# Patient Record
Sex: MALE | Race: WHITE
[De-identification: names, ages, dates, MRNs, and addresses within clinical notes are randomized per-mention and may not be internally consistent; named-entity substitution may affect disease eponyms.]

---

## 2019-03-21 ENCOUNTER — HOSPITAL ENCOUNTER (OUTPATIENT)
Dept: HOSPITAL 91 - GIL | Age: 54
Discharge: HOME | End: 2019-03-21
Payer: COMMERCIAL

## 2019-03-21 ENCOUNTER — HOSPITAL ENCOUNTER (OUTPATIENT)
Dept: HOSPITAL 10 - GIL | Age: 54
Discharge: HOME | End: 2019-03-21
Attending: INTERNAL MEDICINE
Payer: COMMERCIAL

## 2019-03-21 VITALS
WEIGHT: 224.65 LBS | HEIGHT: 73 IN | BODY MASS INDEX: 29.77 KG/M2 | HEIGHT: 73 IN | WEIGHT: 224.65 LBS | BODY MASS INDEX: 29.77 KG/M2

## 2019-03-21 VITALS — RESPIRATION RATE: 16 BRPM | DIASTOLIC BLOOD PRESSURE: 60 MMHG | SYSTOLIC BLOOD PRESSURE: 110 MMHG | HEART RATE: 74 BPM

## 2019-03-21 VITALS — HEART RATE: 84 BPM | RESPIRATION RATE: 18 BRPM | DIASTOLIC BLOOD PRESSURE: 73 MMHG | SYSTOLIC BLOOD PRESSURE: 115 MMHG

## 2019-03-21 DIAGNOSIS — Z12.11: Primary | ICD-10-CM

## 2019-03-21 DIAGNOSIS — I10: ICD-10-CM

## 2019-03-21 DIAGNOSIS — C18.9: ICD-10-CM

## 2019-03-21 PROCEDURE — 45331 SIGMOIDOSCOPY AND BIOPSY: CPT

## 2019-03-21 PROCEDURE — 88305 TISSUE EXAM BY PATHOLOGIST: CPT

## 2019-03-21 NOTE — HPN
Date/Time of Note


Date/Time of Note


DATE: 3/21/19 


TIME: 13:34





Interval H&P Admission Note


Pt. seen H&P reviewed:  No system changes











BARON ROTHMAN                     Mar 21, 2019 13:34